# Patient Record
Sex: FEMALE | Race: WHITE | NOT HISPANIC OR LATINO | URBAN - METROPOLITAN AREA
[De-identification: names, ages, dates, MRNs, and addresses within clinical notes are randomized per-mention and may not be internally consistent; named-entity substitution may affect disease eponyms.]

---

## 2024-02-27 ENCOUNTER — EMERGENCY (EMERGENCY)
Facility: HOSPITAL | Age: 24
LOS: 0 days | Discharge: HOME | End: 2024-02-27
Attending: EMERGENCY MEDICINE
Payer: COMMERCIAL

## 2024-02-27 VITALS — TEMPERATURE: 97 F

## 2024-02-27 VITALS
DIASTOLIC BLOOD PRESSURE: 53 MMHG | SYSTOLIC BLOOD PRESSURE: 112 MMHG | RESPIRATION RATE: 18 BRPM | OXYGEN SATURATION: 99 % | HEART RATE: 80 BPM | TEMPERATURE: 95 F

## 2024-02-27 DIAGNOSIS — R11.2 NAUSEA WITH VOMITING, UNSPECIFIED: ICD-10-CM

## 2024-02-27 DIAGNOSIS — S00.86XA INSECT BITE (NONVENOMOUS) OF OTHER PART OF HEAD, INITIAL ENCOUNTER: ICD-10-CM
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
## 2024-02-27 DIAGNOSIS — Z88.0 ALLERGY STATUS TO PENICILLIN: ICD-10-CM

## 2024-02-27 DIAGNOSIS — R51.9 HEADACHE, UNSPECIFIED: ICD-10-CM
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
## 2024-02-27 LAB
ALBUMIN SERPL ELPH-MCNC: 5 G/DL — SIGNIFICANT CHANGE UP (ref 3.5–5.2)
ALP SERPL-CCNC: 91 U/L — SIGNIFICANT CHANGE UP (ref 30–115)
ALT FLD-CCNC: 24 U/L — SIGNIFICANT CHANGE UP (ref 0–41)
ANION GAP SERPL CALC-SCNC: 12 MMOL/L — SIGNIFICANT CHANGE UP (ref 7–14)
AST SERPL-CCNC: 22 U/L — SIGNIFICANT CHANGE UP (ref 0–41)
BASOPHILS # BLD AUTO: 0.05 K/UL — SIGNIFICANT CHANGE UP (ref 0–0.2)
BASOPHILS NFR BLD AUTO: 0.4 % — SIGNIFICANT CHANGE UP (ref 0–1)
BILIRUB SERPL-MCNC: 0.5 MG/DL — SIGNIFICANT CHANGE UP (ref 0.2–1.2)
BUN SERPL-MCNC: 14 MG/DL — SIGNIFICANT CHANGE UP (ref 10–20)
CALCIUM SERPL-MCNC: 10.1 MG/DL — SIGNIFICANT CHANGE UP (ref 8.4–10.5)
CHLORIDE SERPL-SCNC: 104 MMOL/L — SIGNIFICANT CHANGE UP (ref 98–110)
CO2 SERPL-SCNC: 25 MMOL/L — SIGNIFICANT CHANGE UP (ref 17–32)
CREAT SERPL-MCNC: 0.8 MG/DL — SIGNIFICANT CHANGE UP (ref 0.7–1.5)
EGFR: 105 ML/MIN/1.73M2 — SIGNIFICANT CHANGE UP
EOSINOPHIL # BLD AUTO: 0.02 K/UL — SIGNIFICANT CHANGE UP (ref 0–0.7)
EOSINOPHIL NFR BLD AUTO: 0.2 % — SIGNIFICANT CHANGE UP (ref 0–8)
GLUCOSE SERPL-MCNC: 94 MG/DL — SIGNIFICANT CHANGE UP (ref 70–99)
HCT VFR BLD CALC: 42.3 % — SIGNIFICANT CHANGE UP (ref 37–47)
HGB BLD-MCNC: 14.9 G/DL — SIGNIFICANT CHANGE UP (ref 12–16)
IMM GRANULOCYTES NFR BLD AUTO: 0.4 % — HIGH (ref 0.1–0.3)
LIDOCAIN IGE QN: 29 U/L — SIGNIFICANT CHANGE UP (ref 7–60)
LYMPHOCYTES # BLD AUTO: 1.25 K/UL — SIGNIFICANT CHANGE UP (ref 1.2–3.4)
LYMPHOCYTES # BLD AUTO: 9.5 % — LOW (ref 20.5–51.1)
MCHC RBC-ENTMCNC: 31 PG — SIGNIFICANT CHANGE UP (ref 27–31)
MCHC RBC-ENTMCNC: 35.2 G/DL — SIGNIFICANT CHANGE UP (ref 32–37)
MCV RBC AUTO: 88.1 FL — SIGNIFICANT CHANGE UP (ref 81–99)
MONOCYTES # BLD AUTO: 0.4 K/UL — SIGNIFICANT CHANGE UP (ref 0.1–0.6)
MONOCYTES NFR BLD AUTO: 3 % — SIGNIFICANT CHANGE UP (ref 1.7–9.3)
NEUTROPHILS # BLD AUTO: 11.35 K/UL — HIGH (ref 1.4–6.5)
NEUTROPHILS NFR BLD AUTO: 86.5 % — HIGH (ref 42.2–75.2)
NRBC # BLD: 0 /100 WBCS — SIGNIFICANT CHANGE UP (ref 0–0)
PLATELET # BLD AUTO: 281 K/UL — SIGNIFICANT CHANGE UP (ref 130–400)
PMV BLD: 9.4 FL — SIGNIFICANT CHANGE UP (ref 7.4–10.4)
POTASSIUM SERPL-MCNC: 3.9 MMOL/L — SIGNIFICANT CHANGE UP (ref 3.5–5)
POTASSIUM SERPL-SCNC: 3.9 MMOL/L — SIGNIFICANT CHANGE UP (ref 3.5–5)
PROT SERPL-MCNC: 7.6 G/DL — SIGNIFICANT CHANGE UP (ref 6–8)
RBC # BLD: 4.8 M/UL — SIGNIFICANT CHANGE UP (ref 4.2–5.4)
RBC # FLD: 12.1 % — SIGNIFICANT CHANGE UP (ref 11.5–14.5)
SODIUM SERPL-SCNC: 141 MMOL/L — SIGNIFICANT CHANGE UP (ref 135–146)
WBC # BLD: 13.12 K/UL — HIGH (ref 4.8–10.8)
WBC # FLD AUTO: 13.12 K/UL — HIGH (ref 4.8–10.8)

## 2024-02-27 PROCEDURE — 99284 EMERGENCY DEPT VISIT MOD MDM: CPT | Mod: 25

## 2024-02-27 PROCEDURE — 99451 NTRPROF PH1/NTRNET/EHR 5/>: CPT

## 2024-02-27 PROCEDURE — 85025 COMPLETE CBC W/AUTO DIFF WBC: CPT

## 2024-02-27 PROCEDURE — 99285 EMERGENCY DEPT VISIT HI MDM: CPT

## 2024-02-27 PROCEDURE — 83690 ASSAY OF LIPASE: CPT

## 2024-02-27 PROCEDURE — 80053 COMPREHEN METABOLIC PANEL: CPT

## 2024-02-27 PROCEDURE — 96374 THER/PROPH/DIAG INJ IV PUSH: CPT

## 2024-02-27 PROCEDURE — 93010 ELECTROCARDIOGRAM REPORT: CPT

## 2024-02-27 PROCEDURE — 96375 TX/PRO/DX INJ NEW DRUG ADDON: CPT

## 2024-02-27 PROCEDURE — 93005 ELECTROCARDIOGRAM TRACING: CPT

## 2024-02-27 PROCEDURE — 36415 COLL VENOUS BLD VENIPUNCTURE: CPT

## 2024-02-27 RX ORDER — SODIUM CHLORIDE 9 MG/ML
1000 INJECTION, SOLUTION INTRAVENOUS ONCE
Refills: 0 | Status: COMPLETED | OUTPATIENT
Start: 2024-02-27 | End: 2024-02-27

## 2024-02-27 RX ORDER — METOCLOPRAMIDE HCL 10 MG
10 TABLET ORAL ONCE
Refills: 0 | Status: COMPLETED | OUTPATIENT
Start: 2024-02-27 | End: 2024-02-27

## 2024-02-27 RX ORDER — ONDANSETRON 8 MG/1
1 TABLET, FILM COATED ORAL
Qty: 1 | Refills: 0
Start: 2024-02-27 | End: 2024-03-02

## 2024-02-27 RX ORDER — KETOROLAC TROMETHAMINE 30 MG/ML
15 SYRINGE (ML) INJECTION ONCE
Refills: 0 | Status: DISCONTINUED | OUTPATIENT
Start: 2024-02-27 | End: 2024-02-27

## 2024-02-27 RX ADMIN — Medication 104 MILLIGRAM(S): at 16:35

## 2024-02-27 RX ADMIN — SODIUM CHLORIDE 1000 MILLILITER(S): 9 INJECTION, SOLUTION INTRAVENOUS at 16:35

## 2024-02-27 RX ADMIN — Medication 15 MILLIGRAM(S): at 16:35

## 2024-02-27 NOTE — CONSULT NOTE ADULT - SUBJECTIVE AND OBJECTIVE BOX
MEDICAL TOXICOLOGY CONSULT    HPI:  23F no PMH presents to the ED stating she thinks her face was bitten/stung two days ago when she was vacationing in [place]. Reports the house had scorpions in it. Reports feeling a sting and flicking off whatever stung her though was unable to get a good look at what it was. Immediately after the sting she felt pain, appears to have small wheal and flare reaction and surrounding erythema at the site in her picture of her face from two days ago. Pt also reports a headache and nausea/vomiting around that time. Pt denies any lightheadedness/palpitations/chest pain, denies throat closing sensation/wheezing/shortness of breath denies any muscle spasms/fasciculations/pain distant from sting site. Pt presents today with improvement of her rash and other symptoms.  Vitals: HR 80, /53, RR 18, T 95.2F, 99% RA  EKG:  Exam: small scabbed over puncture site on zygoma of R face without surrounding erythema/warmth/fluctuance, normal pupillary exam, no diaphoresis/flushed skin/tremors/clonus/rigidity/posturing    Toxicology consulted for scorpion sting/envenomation    PAST MEDICAL & SURGICAL HISTORY:      MEDICATION HISTORY:      FAMILY HISTORY:      REVIEW OF SYSTEMS:   _____unable to perform due to intoxication, dementia, or illness      Vital Signs Last 24 Hrs  T(C): 36 (27 Feb 2024 17:16), Max: 36 (27 Feb 2024 17:16)  T(F): 96.8 (27 Feb 2024 17:16), Max: 96.8 (27 Feb 2024 17:16)  HR: 80 (27 Feb 2024 15:55) (80 - 80)  BP: 112/53 (27 Feb 2024 15:55) (112/53 - 112/53)  BP(mean): --  RR: 18 (27 Feb 2024 15:55) (18 - 18)  SpO2: 99% (27 Feb 2024 15:55) (99% - 99%)    Parameters below as of 27 Feb 2024 15:55  Patient On (Oxygen Delivery Method): room air        SIGNIFICANT LABORATORY STUDIES:                        14.9   13.12 )-----------( 281      ( 27 Feb 2024 16:45 )             42.3       02-27    141  |  104  |  14  ----------------------------<  94  3.9   |  25  |  0.8    Ca    10.1      27 Feb 2024 16:45    TPro  7.6  /  Alb  5.0  /  TBili  0.5  /  DBili  x   /  AST  22  /  ALT  24  /  AlkPhos  91  02-27          Urinalysis Basic - ( 27 Feb 2024 16:45 )    Color: x / Appearance: x / SG: x / pH: x  Gluc: 94 mg/dL / Ketone: x  / Bili: x / Urobili: x   Blood: x / Protein: x / Nitrite: x   Leuk Esterase: x / RBC: x / WBC x   Sq Epi: x / Non Sq Epi: x / Bacteria: x        Anion Gap: 12 02-27 @ 16:45  CK: -- 02-27 @ 16:45  Troponin:  --  02-27 @ 16:45  Pro-BNP:  --  02-27 @ 16:45  VBG:  --  02-27 @ 16:45  Carboxyhemoglobin %:  --  02-27 @ 16:45  Methemoglobin %:  --  02-27 @ 16:45  Osmolality Serum:  --  02-27 @ 16:45  Aspirin Level: --  02-27 @ 16:45  Acetaminophen Level:  --  02-27 @ 16:45  Ethanol Level:  --  02-27 @ 16:45  Digoxin Level:  --  02-27 @ 16:45  Phenytoin Level:  --  02-27 @ 16:45  Carbamazepine level:  --  02-27 @ 16:45  Lamotrigine level:  --  02-27 @ 16:45

## 2024-02-27 NOTE — ED PROVIDER NOTE - PHYSICAL EXAMINATION
CONSTITUTIONAL: well-appearing, in NAD  SKIN: Warm dry, minimal erythema no swelling or TTP to 2x2cm area of right cheek  HEAD: NCAT  EYES: EOMI, PERRLA, no scleral icterus, conjunctiva pink  ENT: normal pharynx with no erythema or exudates  NECK: Supple; non tender. Full ROM.  CARD: RRR, no murmurs.  RESP: clear to ausculation b/l. No crackles or wheezing.  ABD: soft, non-tender, non-distended, no rebound or guarding.  EXT: Full ROM, no bony tenderness, no pedal edema, no calf tenderness  NEURO: normal motor. normal sensory. CN II-XII intact. Cerebellar testing normal. Normal gait.  PSYCH: Cooperative, appropriate.

## 2024-02-27 NOTE — ED PROVIDER NOTE - NSFOLLOWUPINSTRUCTIONS_ED_ALL_ED_FT
Insect Bite or Sting    WHAT YOU NEED TO KNOW:    Most insect bites and stings are not dangerous and go away without treatment. Your symptoms may be mild, or you may develop anaphylaxis. Anaphylaxis is a sudden, life-threatening reaction that needs immediate treatment. Common examples of insects that bite or sting are bees, ticks, mosquitoes, spiders, and ants. Insect bites or stings can lead to diseases such as malaria, West Nile virus, Lyme disease, or [place] Spotted Fever.    DISCHARGE INSTRUCTIONS:    Call 911 for signs or symptoms of anaphylaxis, such as trouble breathing, swelling in your mouth or throat, or wheezing. You may also have itching, a rash, hives, or feel like you are going to faint.    Return to the emergency department if:     You are stung on your tongue or in your throat.      A white area forms around the bite.      You are sweating badly or have body pain.      You think you were bitten or stung by a poisonous insect.    Contact your healthcare provider if:     You have a fever.      The area becomes red, warm, tender, and swollen beyond the area of the bite or sting.      You have questions or concerns about your condition or care.    Medicines:     Antihistamines decrease itching and rash.       Epinephrine is used to treat severe allergic reactions such as anaphylaxis.       Take your medicine as directed. Contact your healthcare provider if you think your medicine is not helping or if you have side effects. Tell him of her if you are allergic to any medicine. Keep a list of the medicines, vitamins, and herbs you take. Include the amounts, and when and why you take them. Bring the list or the pill bottles to follow-up visits. Carry your medicine list with you in case of an emergency.    Steps to take for signs or symptoms of anaphylaxis:     Immediately give 1 shot of epinephrine only into the outer thigh muscle.       Leave the shot in place as directed. Your healthcare provider may recommend you leave it in place for up to 10 seconds before you remove it. This helps make sure all of the epinephrine is delivered.       Call 911 and go to the emergency department, even if the shot improved symptoms. Do not drive yourself. Bring the used epinephrine shot with you.     Safety precautions to take if you are at risk for anaphylaxis:     Keep 2 shots of epinephrine with you at all times. You may need a second shot, because epinephrine only works for about 20 minutes and symptoms may return. Your healthcare provider can show you and family members how to give the shot. Check the expiration date every month and replace it before it expires.      Create an action plan. Your healthcare provider can help you create a written plan that explains the allergy and an emergency plan to treat a reaction. The plan explains when to give a second epinephrine shot if symptoms return or do not improve after the first. Give copies of the action plan and emergency instructions to family members, work and school staff, and  providers. Show them how to give a shot of epinephrine.      Carry medical alert identification. Wear medical alert jewelry or carry a card that says you have an insect allergy. Ask your healthcare provider where to get these items. Medical Alert Jewelry         If an insect bites or stings you:     Remove the stinger. Scrape the stinger out with your fingernail, edge of a credit card, or a knife blade. Do not squeeze the wound. Gently wash the area with soap and water.      Remove the tick. Ticks must be removed as soon as possible so you do not get diseases passed through tick bites. Ask your healthcare provider for more information on tick bites and how to remove ticks.    Care for a bite or sting wound:     Elevate the affected area. Prop the wound above the level of your heart, if possible. Elevate the area for 10 to 20 minutes each hour or as directed by your healthcare provider.      Use compresses. Soak a clean washcloth in cold water, wring it out, and put it on the bite or sting. Use the compress for 10 to 20 minutes each hour or as directed by your healthcare provider. After 24 to 48 hours, change to warm compresses.       Apply a paste. Add water to baking soda to make a thick paste. Put the paste on the area for 5 minutes. Rinse gently to remove the paste.     Prevent another insect bite or sting:     Do not wear bright-colored or flower-print clothing when you plan to spend time outdoors. Do not use hairspray, perfumes, or aftershave.      Do not leave food out.      Empty any standing water and wash container with soap and water every 2 days.      Put screens on all open windows and doors.      Put insect repellent that contains DEET on skin that is showing when you go outside. Put insect repellent at the top of your boots, bottom of pant legs, and sleeve cuffs. Wear long sleeves, pants, and shoes.      Use citronella candles outdoors to help keep mosquitoes away. Put a tick and flea collar on pets.    Follow up with your healthcare provider as directed: Write down your questions so you remember to ask them during your visits.

## 2024-02-27 NOTE — ED PROVIDER NOTE - CLINICAL SUMMARY MEDICAL DECISION MAKING FREE TEXT BOX
Patient signed out from Dr. Robins.  Symptoms much improved.  Discussed with toxicology.  Labs unremarkable.  Plan to discharge home.

## 2024-02-27 NOTE — ED PROVIDER NOTE - OBJECTIVE STATEMENT
Patient 23-year-old female with clinical past medical history presenting to ED for evaluation of questionable insect bite to right cheek.  States that she was staying in a house in [place] that was noted to have scorpions throughout it when she woke up 1 morning with pain and swelling to the right side of her face.  Incident occurred 5 days ago and the wound has started to heal however patient started to feel nauseous, have a generalized headache and generalized bodyaches today prompting her to come to the ED for evaluation. Otherwise denies any fever, chills, changes in vision, cough, congestion, cp, palpitations, sob, constipation, urinary complaints, lower extremity pain/swelling.

## 2024-02-27 NOTE — ED ADULT TRIAGE NOTE - CHIEF COMPLAINT QUOTE
C/o headache, nausea, and vomiting. Pt states she was stung on the right upper cheek by either a scorpion or spider while in [place] 2 days ago

## 2024-02-27 NOTE — ED PROVIDER NOTE - PROGRESS NOTE DETAILS
pk: Patient cleared from toxicological standpoint, labs unremarkable, patient resting comfortably has no complaints. all results d/w pt & copies given, strict return precautions discussed, rec outpt f.u with pcp pk: Labs drawn, pain medications given, tox consult placed.

## 2024-02-27 NOTE — ED PROVIDER NOTE - ATTENDING CONTRIBUTION TO CARE
pt with no PMH with bite on right cheek unsure source, pt noted having scorpions in house. + nausea/vomitting no abdominal pain no weakness, + headache, chills no fever.    Labs, tox. r/o bite vs other toxicological syndrome

## 2024-02-27 NOTE — CONSULT NOTE ADULT - ASSESSMENT
Assessment:  Pt states she felt a sting on her face and flicked away whatever it was (didn’t see it) two days ago, initially with a rapidly forming painful erythematous rash that resolved relatively quickly, also reports a headache and nausea/vomiting the day of the sting though now her symptoms have resolved. Differential includes Hymenoptera (bee/wasp sting) vs scorpion sting.     Honeybees contain several allergens and 3 major venom proteins (melittin, phospholipase A2, and hyaluronidase) with melittin acting as detergent to disrupt cell membranes liberating biogenic amines such as histamine which is mediated by mast cell degranulation peptide. Yellowjackets/ hornets/ wasps contain 3 major allergens with antigen 5 thought to be those prominent; acompanied by many vasoactive amines; severe pain after these stings is caused by serotonin, acetylcholine, and wasp kinins. If this sting is from Hymenoptera, the clinical manifestations go along with the effects of the effect of the toxins (immediate pain, small wheal/flare reaction, erythema; can have headache/nausea/vomiting, this is not an IgE mediated allergic reaction but rather from the venom itself). Most immediately concerning with these stings is anaphylaxis (an IgE mediated reaction) or angioedema which pt doesn’t seem to have at this point.    Scorpion stings (in  [place], the bark scorpion is of clinical significance) are typically well tolerated in adults and don’t cause severe toxicity in pts over the age of 6 years old. Venom usually contains acetylcholinesterase, hyaluronidase, serotonin and neurotoxins. The neurotoxins mostly work by opening sodium channels and cause repetitive firing of both sympathetic/parasymptathetic nervous systems. This manifests as a local reaction with intense pain, erythema, tingling/burning. Systemic effects may include autonomic storm with cardiac effects that may include myocarditis, dysrhythmias, infarction, with bizarre EKG changes that may persist for several days. Symptoms begin immediately after sting, peak 5 hours after sting, and may persist for 30 hours after sting.     At this point the pts sting site looks well healed and pts symptoms have resolved so good wound care and ensuring pt is up to date on tetanus should be the focus.    Recommendations:  1. If the primary team is sending labwork, can add on a lipase (though low suspicion for pancreatitis as [place] scorpions typically don’t cause pancreatitis and pt has no abdominal pain/n/v on arrival to ED).   2. Can superficially look to see if the pt has signs of a retained stinger apparatus (wouldn’t dig around and inflict trauma looking for one though).  3. Ensure pts tetanus is up to date  4. Good wound care per primary team Assessment:  Pt states she felt a sting on her face and flicked away whatever it was (didn’t see it) two days ago, initially with a rapidly forming painful erythematous rash that resolved relatively quickly, also reports a headache and nausea/vomiting the day of the sting though now her symptoms have resolved. Differential includes Hymenoptera (bee/wasp sting) vs scorpion sting.     Honeybees contain several allergens and 3 major venom proteins (melittin, phospholipase A2, and hyaluronidase) with melittin acting as detergent to disrupt cell membranes liberating biogenic amines such as histamine which is mediated by mast cell degranulation peptide. Yellowjackets/ hornets/ wasps contain 3 major allergens with antigen 5 thought to be those prominent; acompanied by many vasoactive amines; severe pain after these stings is caused by serotonin, acetylcholine, and wasp kinins. If this sting is from Hymenoptera, the clinical manifestations go along with the effects of the effect of the toxins (immediate pain, small wheal/flare reaction, erythema; can have headache/nausea/vomiting, this is not an IgE mediated allergic reaction but rather from the venom itself). Most immediately concerning with these stings is anaphylaxis (an IgE mediated reaction) or angioedema which pt doesn’t seem to have at this point.    Scorpion stings (in  [place], the bark scorpion is of clinical significance) are typically well tolerated in adults and don’t cause severe toxicity in pts over the age of 6 years old. Venom usually contains acetylcholinesterase, hyaluronidase, serotonin and neurotoxins. The neurotoxins mostly work by opening sodium channels and cause repetitive firing of both sympathetic/parasymptathetic nervous systems. This manifests as a local reaction with intense pain, erythema, tingling/burning. Systemic effects may include autonomic storm with cardiac effects that may include myocarditis, dysrhythmias, infarction, with bizarre EKG changes that may persist for several days. Symptoms begin immediately after sting, peak 5 hours after sting, and may persist for 30 hours after sting.     At this point the pts sting site looks well healed and pts symptoms have resolved so good wound care and ensuring pt is up to date on tetanus should be the focus.    Recommendations:  1. If the primary team is sending labwork, can add on a lipase (though low suspicion for pancreatitis as [place] scorpions typically don’t cause pancreatitis and pt has no abdominal pain/n/v on arrival to ED).   2. Can superficially look to see if the pt has signs of a retained stinger apparatus (wouldn’t dig around and inflict trauma looking for one though).  3. Ensure pts tetanus is up to date  4. Good wound care per primary team    I personally discussed with ED team. I reviewed the med tox fellow’s note (as assigned above), and agree with the findings and plan except as documented in my note.  --Will continue to follow. Please call with any further questions    May    [phone] (pager)

## 2024-02-27 NOTE — ED PROVIDER NOTE - PATIENT PORTAL LINK FT
You can access the FollowMyHealth Patient Portal offered by [facility] by registering at the following website: [email or web address]. By joining [email or web address] [email or web address] portal, you will also be able to view your health information using other applications (apps) compatible with our system.